# Patient Record
Sex: FEMALE
[De-identification: names, ages, dates, MRNs, and addresses within clinical notes are randomized per-mention and may not be internally consistent; named-entity substitution may affect disease eponyms.]

---

## 2019-04-01 ENCOUNTER — HOSPITAL ENCOUNTER (EMERGENCY)
Dept: HOSPITAL 31 - C.ER | Age: 44
Discharge: HOME | End: 2019-04-01
Payer: COMMERCIAL

## 2019-04-01 VITALS
TEMPERATURE: 98 F | HEART RATE: 89 BPM | RESPIRATION RATE: 20 BRPM | SYSTOLIC BLOOD PRESSURE: 132 MMHG | OXYGEN SATURATION: 100 % | DIASTOLIC BLOOD PRESSURE: 81 MMHG

## 2019-04-01 VITALS — BODY MASS INDEX: 20.9 KG/M2

## 2019-04-01 DIAGNOSIS — M54.30: Primary | ICD-10-CM

## 2019-04-01 DIAGNOSIS — E78.00: ICD-10-CM

## 2019-04-01 NOTE — C.PDOC
History Of Present Illness





43 year old female with Hx of recurrent sciatica presents with pain radiating 

down her right buttock to her thigh and lower leg. Patient states she sits for 

long hours and believes this may be causing her pain. Patient has not seen an 

orthopedist for this problem. No other complaints.





Time Seen by Provider: 04/01/19 21:33


Chief Complaint (Nursing): Lower Extremity Problem/Injury


History Per: Patient


History/Exam Limitations: no limitations


Onset/Duration Of Symptoms: Hrs


Current Symptoms Are (Timing): Still Present


Quality Of Discomfort: Unable To Describe


Previous Symptoms: Other (Sciatica)


Associated Symptoms: None


Exacerbating Factor(s): Sitting


Recent travel outside of the [place]: No





Past Medical History


Reviewed: Historical Data, Nursing Documentation, Vital Signs


Vital Signs: 





                                Last Vital Signs











Temp  98 F   04/01/19 21:27


 


Pulse  89   04/01/19 21:27


 


Resp  20   04/01/19 21:27


 


BP  132/81   04/01/19 21:27


 


Pulse Ox  100   04/01/19 21:27














- Medical History


PMH: Anxiety, Back Problems, Hypercholesterolemia, Hyperthyroidism, 

Hypothyroidism, Migraine


Family History: States: Unknown Family Hx





- Social History


Hx Tobacco Use: No


Hx Alcohol Use: No


Hx Substance Use: No





- Immunization History


Hx Tetanus Toxoid Vaccination: No


Hx Influenza Vaccination: No


Hx Pneumococcal Vaccination: No





Review Of Systems


Constitutional: Negative for: Fever, Chills


Eyes: Negative for: Pain, Redness


ENT: Negative for: Mouth Swelling


Cardiovascular: Negative for: Chest Pain, Palpitations


Respiratory: Negative for: Cough, Shortness of Breath


Gastrointestinal: Negative for: Nausea, Vomiting, Diarrhea


Genitourinary: Negative for: Dysuria, Hematuria


Musculoskeletal: Positive for: Other (Right buttock pain radiating down thigh to

lower leg).  Negative for: Back Pain


Skin: Negative for: Rash


Neurological: Negative for: Weakness, Numbness, Dizziness





Physical Exam





- Physical Exam


Appears: Well, Non-toxic, No Acute Distress


Skin: Normal Color, Warm, No Rash


Head: Atraumatic, Normacephalic


Eye(s): bilateral: Normal Inspection, PERRL, EOMI


Oral Mucosa: Moist


Chest: Symmetrical


Respiratory: No Accessory Muscle Use, Other (Normal inspiratory effort)


Gastrointestinal/Abdominal: Soft, No Distention


Back: Straight Leg Raising (Right leg positive)


Extremity: Normal ROM (x4), Other (Tenderness to right gluteus and posterior 

thigh)


Neurological/Psych: Oriented x3, Normal Speech, Normal Cranial Nerves (Grossly 

intact), Normal Motor, Normal Sensation


Gait: Steady





ED Course And Treatment


O2 Sat by Pulse Oximetry: 100 (Room air)


Pulse Ox Interpretation: Normal





Medical Decision Making


Medical Decision Making: 





Motrin and tramadol given, patient stable, for dc, ortho referral given.





Disposition


Counseled Patient/Family Regarding: Diagnosis, Need For Followup, Rx Given





- Disposition


Referrals: 


Anthony Porter MD [Staff Provider] - 


Disposition: HOME/ ROUTINE


Disposition Time: 22:10


Condition: STABLE


Prescriptions: 


Ibuprofen [Motrin Tab] 800 mg PO TID PRN #21 tab


 PRN Reason: Pain, Moderate (4-7)


traMADol [Ultram] 50 mg PO TID PRN #15 tab


 PRN Reason: Pain, Severe (8-10)


Instructions:  Sciatica (DC)


Forms:  General Discharge Instructions, [facility] (English), Work Excuse





- Clinical Impression


Clinical Impression: 


 Sciatica








- PA / NP / Resident Statement


MD/DO has reviewed & agrees with the documentation as recorded.





- Scribe Statement


The provider has reviewed the documentation as recorded by the Scribyamile Thrasher





All medical record entries made by the Scribe were at my direction and 

personally dictated by me. I have reviewed the chart and agree that the record 

accurately reflects my personal performance of the history, physical exam, 

medical decision making, and the department course for this patient. I have also

personally directed, reviewed, and agree with the discharge instructions and 

disposition.